# Patient Record
Sex: FEMALE | Race: WHITE | Employment: FULL TIME | URBAN - NONMETROPOLITAN AREA
[De-identification: names, ages, dates, MRNs, and addresses within clinical notes are randomized per-mention and may not be internally consistent; named-entity substitution may affect disease eponyms.]

---

## 2021-03-08 ENCOUNTER — HOSPITAL ENCOUNTER (OUTPATIENT)
Dept: PHYSICAL THERAPY | Age: 31
Discharge: HOME | End: 2021-03-08
Payer: COMMERCIAL

## 2021-03-08 PROCEDURE — 97161 PT EVAL LOW COMPLEX 20 MIN: CPT

## 2021-03-08 NOTE — PROGRESS NOTES
PT INITIAL EVALUATION NOTE 2-15    Patient Name: Lauren Pagan  Date:3/8/2021  : 1990  [x]  Patient  Verified  Payor: /    In time:15:38  Out time:16:16  Total Treatment Time (min): 38  Visit #: 1     Treatment Area: Low back pain [M54.5]    SUBJECTIVE  Pain Level (0-10 scale): 8/10 (achy, sharp, stabbing, throbbing); worst: 10/10  Any medication changes, allergies to medications, adverse drug reactions, diagnosis change, or new procedure performed?: [] No    [x] Yes (see summary sheet for update)  Subjective: Pt reports she starting having lower back pain approximately 5 years ago but it has recently worsened. Pt reports that she recently had a fall in 2021 where she slipped on water and fell directly on her back. Pt reports she went to the emergency room as a result of the fall due to pain. Pt reports they performed an x-ray and CT scan, but told her to follow-up with her PCP. 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 Pt reports she received 2 injections in 2020 and 2020 which did not provide any relief of her pain. Pt reports she has pain that runs down into her L thigh as well as numbness and tingling that runs down into both of her feet. Pt reports she will be receiving a nerve block on 3/29/21 for the pain.      PLOF: community ambulator; independent with ADLs; children help with carrying groceries into apartment  Mechanism of Injury: chronic  Previous Treatment/Compliance: previous treatment for LBP  Radiographs: x-ray, CT scan, MRI - pt reports \"arthritis and bones are rubbing together\"  What increases symptoms: prolonged standing, prolonged walking, lifting, stairs  What decreases symptoms: heat  PMHx/Surgical Hx: depression, arthritis, asthma, recent weight change  Work Hx: phlebotomist  Living Situation: apartment; 14 stairs; L sided hand railing  Pt Goals: \"to lessen the pain\"  Barriers: none  Motivation: good   Substance use: none noted   Cognition: A & O x 4   Fall Assessment: TU seconds       OBJECTIVE/EXAMINATION  Posture:  Good  Palpation: Difficult to palpate landmarks due to increased adipose tissue; TTP in generalized area of L4-S1 spinous processes and B lumbar paraspinals. Sensation: LT sensation intact in B LEs. Lumbar AROM (degrees):      Flexion             45 p! Extension             9 p! R           L  Side Bending   5 p!   9 p! Rotation   24 p!   29 p!       Manual Muscle Testing R  L  Hip Flexion                    5/5                  4+/5  Hip Abduction   5/5  5/5  Hip Adduction   5/5  5/5  Knee Extension            5/5                   5/5  Knee Flexion                5/5  5/5  Ankle PF   5/5  5/5  Ankle DF   5/5  4+/5  Great Toe Extension      Flexibility: Bilateral HS tightness      Special Tests: Slump: Positive    H.S. SLR: Positive            With   [x] TE   [] TA   [] neuro   [] other: Patient Education: [x] Provided HEP    [] Progressed/Changed HEP based on:   [] positioning   [] body mechanics   [] transfers   [] heat/ice application    [] other:        Pain Level (0-10 scale) post treatment: 8/10      ASSESSMENT:      [x]  See Plan of [address] Fairbanks PT, DPT 3/8/2021

## 2021-03-08 NOTE — PROGRESS NOTES
[address]  [place], [place]  Ph: [phone]    Fax: [phone]    Plan of Care/Statement of Necessity for Physical Therapy Services  2-15    Patient name: Freda Wills  : 1990  Provider#: [id number]  Referral source: Wilver Chairez MD      Medical/Treatment Diagnosis: Low back pain [M54.5]     Prior Hospitalization: see medical history     Comorbidities: see medical history  Prior Level of Function: community ambulator; independent with ADLs  Medications: Verified on Patient Summary List    Start of Care: 3/8/2021      Onset Date: chronic       The Plan of Care and following information is based on the information from the initial evaluation. Assessment/ key information: Pt is a pleasant 26 y.o. female presenting to physical therapy with signs and symptoms consistent with LBP with lumbar radiculopathy. Pt demonstrates increased lumbar and LE pain, presence of numbness and tingling, decreased lumbar AROM, decreased LE strength, as well as decreased functional mobility. Pt would benefit from skilled physical therapy to improve pain with the use of modalities, improve ROM and LE strength with therapeutic exercises, and improve functional mobility.       Evaluation Complexity History HIGH Complexity :3+ comorbidities / personal factors will impact the outcome/ POC ; Examination HIGH Complexity : 4+ Standardized tests and measures addressing body structure, function, activity limitation and / or participation in recreation  ;Presentation LOW Complexity : Stable, uncomplicated  ;Clinical Decision Making TUG Score: 11 seconds  Overall Complexity Rating: LOW     Problem List: pain affecting function, decrease ROM, decrease strength, decrease ADL/ functional abilitiies, decrease flexibility/ joint mobility and decrease transfer abilities   Treatment Plan may include any combination of the following: Therapeutic exercise, Therapeutic activities, Neuromuscular re-education, Physical agent/modality, Manual therapy, Patient education, Functional mobility training and Stair training  Patient / Family readiness to learn indicated by: trying to perform skills  Persons(s) to be included in education: patient (P)  Barriers to Learning/Limitations: None  Patient Goal (s): to lessen pain  Patient Self Reported Health Status: fair  Rehabilitation Potential: good    Short Term Goals: To be accomplished in 4 treatments:  1. Pt will be independent and compliant with HEP to facilitate functional mobility. 2. Pt will demonstrate proper lifting technique to facilitate lifting boxes at work. 3. Pt will be able to stand for at least 15 minutes with pain no greater than a 6/10 to facilitate washing dishes. 4. Pt will be able to ambulate 500' with pain no greater than a 6/10 to facilitate household ambulation. Long Term Goals: To be accomplished in 8 treatments:  1. Pt will demonstrate centralization of symptoms to facilitate functional mobility. 2. Pt will be able to stand for at least 30 minutes with pain no greater than a 4/10 to facilitate washing dishes. 3. Pt will be able to ambulate 0.25 miles with pain no greater than a 4/10 to facilitate community ambulation. 4. Pt will be able to lift 20# from floor to table with proper technique to facilitate work related duties. Frequency / Duration: Patient to be seen 2 times per week for 4 weeks. Patient/ Caregiver education and instruction: exercises    [x]  Plan of care has been reviewed with WILFRIDO Isaacs, PT, DPT 3/8/2021     ________________________________________________________________________    I certify that the above Therapy Services are being furnished while the patient is under my care. I agree with the treatment plan and certify that this therapy is necessary.     Physician's Signature:____________________  Date:____________Time: _________    Patient name: Nella Ron  : 1990  Provider#: [id number]

## 2021-03-12 ENCOUNTER — APPOINTMENT (OUTPATIENT)
Dept: PHYSICAL THERAPY | Age: 31
End: 2021-03-12
Payer: COMMERCIAL

## 2021-03-16 ENCOUNTER — HOSPITAL ENCOUNTER (OUTPATIENT)
Dept: PHYSICAL THERAPY | Age: 31
Discharge: HOME | End: 2021-03-16
Payer: COMMERCIAL

## 2021-03-16 PROCEDURE — 97014 ELECTRIC STIMULATION THERAPY: CPT

## 2021-03-16 PROCEDURE — 97110 THERAPEUTIC EXERCISES: CPT

## 2021-03-16 NOTE — PROGRESS NOTES
PT DAILY TREATMENT NOTE 2-15    Patient Name: Porter Garza  Date:3/16/2021  : 1990  [x]  Patient  Verified  Payor: Erik Hernandez / Plan: [employer] HMO / Product Type: HMO /    In time:03:20 PM  Out time: 4:00 PM  Total Treatment Time (min): 40  Visit #:  2    Treatment Area: Low back pain [M54.5]    SUBJECTIVE  Pain Level (0-10 scale): 7    Any medication changes, allergies to medications, adverse drug reactions, diagnosis change, or new procedure performed?: [x] No    [] Yes (see summary sheet for update)     Subjective functional status/changes:   [x] No changes reported      OBJECTIVE    Modality rationale: decrease inflammation and decrease pain to improve the patients ability to stand for at least 15 minutes with pain no greater than a 6/10 to facilitate washing dishes. Min Type Additional Details      15 [x] Estim: []Att   [x]Unatt    []TENS instruct                  [x]IFC  []Premod   []NMES                     []Other:  []w/US   []w/ice   []w/heat  Position: prone  Location:low back       []  Traction: [] Cervical       []Lumbar                       [] Prone          []Supine                       []Intermittent   []Continuous Lbs:  [] before manual  [] after manual  []w/heat    []  Ultrasound: []Continuous   [] Pulsed                       at: []1MHz   []3MHz Location:  W/cm2:    [] Paraffin         Location:   []w/heat    []  Ice     []  Heat  []  Ice massage Position:  Location:    []  Laser  []  Other: Position:  Location:      []  Vasopneumatic Device Pressure:       [] lo [] med [] hi   Temperature:      [x] Skin assessment post-treatment:  [x]intact []redness- no adverse reaction    []redness - adverse reaction:         25 min Therapeutic Exercise:  [x] See flow sheet :   Rationale: increase ROM to improve the patients ability to increase daily activities.            With   [] TE   [] TA   [] Neuro   [] SC   [] other: Patient Education: [x] Review HEP    [] Progressed/Changed HEP based on: [] positioning   [] body mechanics   [] transfers   [] heat/ice application    [] other:        Pain Level (0-10 scale) post treatment: 7    ASSESSMENT/Changes in Function:    Patient was experiencing no change in pain level of the low back. Decreased lumbar range of motion in trunk rotation. Patient will need copy of a comprehensive exercise program on the next visit. Patient will continue to benefit from skilled PT services to address ROM deficits and analyze and address soft tissue restrictions to attain remaining goals. [x]  See Plan of Care  []  See progress note/recertification  []  See Discharge Summary         Progress towards goals / Updated goals:  Short Term Goals: To be accomplished in 4 treatments:  1. Pt will be independent and compliant with HEP to facilitate functional mobility. 2. Pt will demonstrate proper lifting technique to facilitate lifting boxes at work. 3. Pt will be able to stand for at least 15 minutes with pain no greater than a 6/10 to facilitate washing dishes. 4. Pt will be able to ambulate 500' with pain no greater than a 6/10 to facilitate household ambulation.     Long Term Goals: To be accomplished in 8 treatments:  1. Pt will demonstrate centralization of symptoms to facilitate functional mobility. 2. Pt will be able to stand for at least 30 minutes with pain no greater than a 4/10 to facilitate washing dishes. 3. Pt will be able to ambulate 0.25 miles with pain no greater than a 4/10 to facilitate community ambulation.    4. Pt will be able to lift 20# from floor to table with proper technique to facilitate work related duties    PLAN  []  Upgrade activities as tolerated     [x]  Continue plan of care  []  Update interventions per flow sheet       []  Discharge due to:_  []  Other:_      Alberto Bolanos, PT 3/16/2021

## 2021-04-16 NOTE — PROGRESS NOTES
[address]  [place], [address] [place]  Ph: [phone]     Fax: [phone]    Discharge Summary 2-15    Patient name: Porter Garza  : 1990  Provider#: [id number]  Referral source: Aissatou Tilley MD      Medical/Treatment Diagnosis: Low back pain [M54.5]     Prior Hospitalization: see medical history     Comorbidities: See Plan of Care  Prior Level of Function: See Plan of Care  Medications: Verified on Patient Summary List    Start of Care: 3/8/2021      Onset Date:chronic    Visits from Start of Care: 2     Missed Visits: 2  Reporting Period : 3/8/2021 to 3/16/2021    Assessment/Summary of care:     Patient has not returned for treatment since 3/16/2021. Patient has completed only 1 treatment session in addition to her Initial Evaluation, and has missed multiple visits since then. Patient was called secondary to absence  and states she has recently went to a Pain Management Specialist, who has also written her a physical therapy prescription with the same diagnosis. Per patient request, pt would like to discontinue her physical therapy course under Dr. Bailey Arzate, and begin a new physical therapy course under the Pain Management Specialist.  Patient is now formally discharged from physical therapy for this reason. Goals have not been formally assessed due to patient's discontinuance. Progress Toward Goals:  Short Term Goals: To be accomplished in 4 treatments:  1. Pt will be independent and compliant with HEP to facilitate functional mobility. -Not Met  2. Pt will demonstrate proper lifting technique to facilitate lifting boxes at work.  -Not Met  3. Pt will be able to stand for at least 15 minutes with pain no greater than a 6/10 to facilitate washing dishes. -Not Met  4.  Pt will be able to ambulate 500' with pain no greater than a 6/10 to facilitate household ambulation. -Not Met     Long Term Goals: To be accomplished in 8 treatments:  1. Pt will demonstrate centralization of symptoms to facilitate functional mobility.  -Not Met  2. Pt will be able to stand for at least 30 minutes with pain no greater than a 4/10 to facilitate washing dishes. -Not Met  3. Pt will be able to ambulate 0.25 miles with pain no greater than a 4/10 to facilitate community ambulation.  -Not Met  4.  Pt will be able to lift 20# from floor to table with proper technique to facilitate work related duties. -Not Met        RECOMMENDATIONS:  [x]Discontinue therapy: []Patient has reached or is progressing toward set goals     []Patient is non-compliant or has abdicated     []Due to lack of appreciable progress towards set goals     [x]Other- See above    Elena Gallegos, PT, DPT  4/16/2021

## 2021-04-20 ENCOUNTER — HOSPITAL ENCOUNTER (OUTPATIENT)
Dept: PHYSICAL THERAPY | Age: 31
Discharge: HOME | End: 2021-04-20
Payer: COMMERCIAL

## 2021-04-20 PROCEDURE — 97161 PT EVAL LOW COMPLEX 20 MIN: CPT

## 2021-04-20 NOTE — PROGRESS NOTES
[address]  [place], [place]  Ph: [phone]    Fax: [phone]    Plan of Care/Statement of Necessity for Physical Therapy Services  2-15    Patient name: Kate Brizuela  : 1990  Provider#: [id number]  Referral source: PHUC Bertrand      Medical/Treatment Diagnosis: Low back pain [M54.5]     Prior Hospitalization: see medical history     Comorbidities: none  Prior Level of Function: independent  Medications: Verified on Patient Summary List    Start of Care: 2021      Onset Date: 2021       The Plan of Care and following information is based on the information from the initial evaluation. Assessment/ key information: Patient is a 26year old black female presenting signs and symptoms consistent to LBP with intermittent lumbar radiculopathy of the left LE. Patient demonstrated increased lumbar pain (extension and lateral flexion), decrease lumbar AROM, decreased LE strength as well as decreased functional mobility. Patient would benefit from skilled Physical Therapy to improve pain with the use of modalities, improve ROM and LE strength with therapeutic exercises and improve functional mobility. Patient will continue Physical Therapy 1 time a week due to job in [place], [place]. A home exercise program will be established with  1:1 supervision for compliance.     Evaluation Complexity History LOW Complexity : Zero comorbidities / personal factors that will impact the outcome / POC; Examination LOW Complexity : 1-2 Standardized tests and measures addressing body structure, function, activity limitation and / or participation in recreation  ;Presentation LOW Complexity : Stable, uncomplicated  ;Clinical Decision Making (TUG Test 12 sec  Overall Complexity Rating: Low  Problem List: pain affecting function, decrease ROM, decrease strength and decrease ADL/ functional abilitiies   Treatment Plan may include any combination of the following: Therapeutic exercise, Therapeutic activities, Neuromuscular re-education, Physical agent/modality, Manual therapy, Patient education and Functional mobility training  Patient / Family readiness to learn indicated by: asking questions  Persons(s) to be included in education: patient (P)  Barriers to Learning/Limitations: None  Patient Goal (s): To decrease low back pain  Patient Self Reported Health Status: fair  Rehabilitation Potential: fair    Short Term Goals: To be accomplished in 5 treatments:   Patient will be independent and compliant with HEP to facilitate functional mobility. Patient will be able to stand at least 15 minutes with pain no greater than 6/10 to facilitate washing dishes. Patient will be able to ambulate for at 3 minutes with pain no greater than a 6/10 to facilitate household ambulation. Long Term Goals: To be accomplished in 10  treatments:   Patient will be able to stand at least 30 minutes with pain no greater than 6/10 to facilitate washing dishes. Patient will be able to ambulate for at 5 minutes with pain no greater than a 6/10 to facilitate household ambulation. Patient will be able to ambulate for at least 10 minutes with pain no greater than 4/10 to facilitate community distances. Patient will be able to sleep in bed  be for at least 4 hours without disturbance from pain. Frequency / Duration: Patient to be seen 1-2  times per week for 10  treatments due to job schedule. Patient/ Caregiver education and instruction: exercises    [x]  Plan of care has been reviewed with WILFRIDO Marin, PT 4/20/2021     ________________________________________________________________________    I certify that the above Therapy Services are being furnished while the patient is under my care. I agree with the treatment plan and certify that this therapy is necessary.     [facility] Signature:____________________  Date:____________Time: _________      Jalil Guallpa MD

## 2021-04-20 NOTE — PROGRESS NOTES
PT INITIAL EVALUATION NOTE 2-15    Patient Name: Vick Rivas  Date:2021  : 1990  [x]  Patient  Verified  Payor: [employer] / Plan: [employer]isha [employer]elfe[employer] HMO / Product Type: HMO /    In time:10:15 AM  Out time: 10:50    Visit #: 1     Treatment Area: Low back pain [M54.5]    SUBJECTIVE  Pain Level (0-10 scale): 7  Any medication changes, allergies to medications, adverse drug reactions, diagnosis change, or new procedure performed?: [] No    [x] Yes (see summary sheet for update)    Subjective: 26year old black female re-referred to Physical Therapy by PHUC Hernandez at pain clinic to continue treatment for low back pain. Patient stated that she did fall in 2021 and injured her back. Since that time, pain gradually became worse. Patient would like to continue Physical Therapy before receiving a spinal block from Dr. Silvia Temple specialist. Chief Complaint: Not able to stand over 15 minutes, difficulty climbing steps, positional changes in sitting position, difficulty sleeping in bed. PLOF: Independent  Mechanism of Injury: Fall on 2021  Previous Treatment/Compliance: 2 lumbar epidural injection   PMHx/Surgical Hx: Physical Therapy for 2 visits on 2021  Work Hx: Phlebotomist   Living Situation: Apartment with family  Pt Goals:  To decrease pain  Barriers: none  Motivation: Good  Substance use: none   Cognition: A & O x 4        OBJECTIVE/EXAMINATION    Other Objective/Functional Measures:   TUG Test: 12 sec  Lumbar Oswestry Score:  46%  Pain Level (0-10 scale) post treatment: 7    OBJECTIVE    Posture: Fair - rounded shoulders, forward head  Gait and Functional Mobility:  No Assistive device  Palpation: Tenderness of Lumbar  L1-L5 vertebrae   Sensation: Intact        Lumbar AROM:      Flexion             45 degrees      Extension            10 degrees                     R                    L  Side Bending   30 degrees  30 degrees    Rotation   30 degrees  40 degrees      Manual Muscle Testing  Hip Flexion                  4/5 4/5  Knee Extension           4/5 4/5  Knee Flexion               4/5 4/5  Ankle PF  5/5 5/5  Ankle DF  5/5 5/5            ASSESSMENT:      [x]  See Plan of [facility], PT 4/20/2021

## 2021-04-27 ENCOUNTER — HOSPITAL ENCOUNTER (OUTPATIENT)
Dept: PHYSICAL THERAPY | Age: 31
Discharge: HOME | End: 2021-04-27
Payer: COMMERCIAL

## 2021-04-27 PROCEDURE — 97014 ELECTRIC STIMULATION THERAPY: CPT

## 2021-04-27 PROCEDURE — 97110 THERAPEUTIC EXERCISES: CPT

## 2021-04-27 NOTE — PROGRESS NOTES
PT DAILY TREATMENT NOTE 2-15    Patient Name: La Nena Charles  Date:2021  : 1990  [x]  Patient  Verified  Payor: Peng Kahlil / Plan: Geri Marquez HMO / Product Type: HMO /    In time: 10:03  Out time: 10:56  Total Treatment Time (min): 48  Visit #:  2    Treatment Area: Low back pain [M54.5]    SUBJECTIVE  Pain Level (0-10 scale): 7/10  Any medication changes, allergies to medications, adverse drug reactions, diagnosis change, or new procedure performed?: [x] No    [] Yes (see summary sheet for update)  Subjective functional status/changes:   [x] No changes reported    OBJECTIVE      Modality rationale: decrease pain and increase tissue extensibility to improve the patients ability to be able to perform AROM   Min Type Additional Details      10 [x] Estim: []Att   [x]Unatt    []TENS instruct                  [x]IFC  []Premod   []NMES                    []Other:  []w/US      [x]w/ heat  []w/ ice  Position: seated   Location: low back       []  Traction: [] Cervical       []Lumbar                       [] Prone          []Supine                       []Intermittent   []Continuous Lbs:  [] before manual  [] after manual  [] w/ heat  [] Simultaneously performed with w/ Estim    []  Ultrasound: []Continuous   [] Pulsed                       at: []1MHz   []3MHz Location:  W/cm2:    [] Paraffin         Location:   []w/heat    []  Ice     []  Heat  []  Ice massage Position:  Location:    []  Laser  []  Other: Position:  Location:      []  Vasopneumatic Device Pressure:       [] lo [] med [] hi   [] w/ ice      Temperature:   [] Simultaneously performed with w/ Estim     [x] Skin assessment post-treatment:  [x]intact [x]redness- no adverse reaction    []redness  adverse reaction:       43 min Therapeutic Exercise:  [] See flow sheet :   Rationale: increase ROM and increase strength to improve the patients ability to improve functional mobility         With   [x] TE   [] TA   [] neuro   [] other: Patient Education: [x] Review HEP    [] Progressed/Changed HEP based on:   [] positioning   [] body mechanics   [] transfers   [] heat/ice application    [] other:      Other Objective/Functional Measures: Initiated ROM and strengthening today. Pain Level (0-10 scale) post treatment: 6/10    ASSESSMENT/Changes in Function: Patient tolerated treatment fairly well. No reports of increased pain, just tightness with stretches. Patient will continue to benefit from skilled PT services to address functional mobility deficits, address ROM deficits and address strength deficits to attain remaining goals. [x]  See Plan of Care  []  See progress note/recertification  []  See Discharge Summary         Progress towards goals / Updated goals:  Short Term Goals: To be accomplished in 5 treatments:               Patient will be independent and compliant with HEP to facilitate functional mobility. Patient will be able to stand at least 15 minutes with pain no greater than 6/10 to facilitate washing dishes. Patient will be able to ambulate for at 3 minutes with pain no greater than a 6/10 to facilitate household ambulation.     Long Term Goals: To be accomplished in 10  treatments:               Patient will be able to stand at least 30 minutes with pain no greater than 6/10 to facilitate washing dishes. Patient will be able to ambulate for at 5 minutes with pain no greater than a 6/10 to facilitate household ambulation. Patient will be able to ambulate for at least 10 minutes with pain no greater than 4/10 to facilitate community distances. Patient will be able to sleep in bed  be for at least 4 hours without disturbance from pain.     PLAN  [x]  Upgrade activities as tolerated     [x]  Continue plan of care  []  Update interventions per flow sheet       []  Discharge due to:_  []  Other:_      ANDREW Garza 4/27/2021

## 2021-05-18 ENCOUNTER — HOSPITAL ENCOUNTER (OUTPATIENT)
Dept: PHYSICAL THERAPY | Age: 31
Discharge: HOME | End: 2021-05-18
Payer: COMMERCIAL

## 2021-05-18 PROCEDURE — 97110 THERAPEUTIC EXERCISES: CPT

## 2021-05-18 PROCEDURE — 97014 ELECTRIC STIMULATION THERAPY: CPT

## 2021-05-18 NOTE — PROGRESS NOTES
PT DAILY TREATMENT NOTE 2-15    Patient Name: Tammy Mullen  Date:2021  : 1990  [x]  Patient  Verified  Payor: Heidy Delgado / Plan: Quique Patel HMO / Product Type: HMO /    In time:10:05 AM  Out time:11:00 AM  Total Treatment Time (min): 55  Visit #: 3    Treatment Area: Low back pain [M54.5]    SUBJECTIVE  Pain Level (0-10 scale): 7  Any medication changes, allergies to medications, adverse drug reactions, diagnosis change, or new procedure performed?: [x] No    [] Yes (see summary sheet for update)  Subjective functional status/changes:   [] No changes reported    I continue to work in [place]. Sometimes the ride bother me.     OBJECTIVE    Modality rationale: decrease inflammation, decrease pain and increase tissue extensibility to improve the patients ability to decrease inflammation, decrease pain and increase tissue extensibility   Min Type Additional Details      15 [] Estim: []Att   [x]Unatt    []TENS instruct                  [x]IFC  []Premod   []NMES                     []Other:  []w/US   []w/ice   [x]w/heat  Position: sitting  Location: low back       []  Traction: [] Cervical       []Lumbar                       [] Prone          []Supine                       []Intermittent   []Continuous Lbs:  [] before manual  [] after manual  []w/heat    []  Ultrasound: []Continuous   [] Pulsed                       at: []1MHz   []3MHz Location:  W/cm2:    [] Paraffin         Location:   []w/heat    []  Ice     []  Heat  []  Ice massage Position:  Location:    []  Laser  []  Other: Position:  Location:      []  Vasopneumatic Device Pressure:       [] lo [] med [] hi   Temperature:      [x] Skin assessment post-treatment:  [x]intact []redness- no adverse reaction    []redness  adverse reaction:         40 min Therapeutic Exercise:  [x] See flow sheet :   Rationale: increase ROM and increase strength to improve the patients ability to and at least 15 minutes   with pain no greater than 6/10 to facilitate washing dishes. With   [] TE   [] TA   [] Neuro   [] SC   [] other: Patient Education: [x] Review HEP    [] Progressed/Changed HEP based on:   [] positioning   [] body mechanics   [] transfers   [] heat/ice application    [] other:      Other Objective/Functional Measures: No swelling of the low back. Pain is present with increased mobility. Pain Level (0-10 scale) post treatment: 6    ASSESSMENT/Changes in Function:   Review HEP for compliance. Increase activities to include lumbar stabilization and flexible exercises to improve mobility. Patient will continue to benefit from skilled PT services to address ROM deficits, address strength deficits, analyze and address soft tissue restrictions and analyze and cue movement patterns to attain remaining goals. [x]  See Plan of Care  []  See progress note/recertification  []  See Discharge Summary           Progress towards goals / Updated goals:  Short Term Goals: To be accomplished in 5 treatments:    Goal:Patient will be independent and compliant with HEP to facilitate functional mobility. Status at Progress report:    Goal: Patient will be able to stand at least 15 minutes with pain no greater than 6/10 to facilitate washing dishes. Status at Progress report:     Goal: Patient will be able to ambulate for at 3 minutes with pain no greater than a 6/10 to facilitate household ambulation.    Status at Progress report:   [facility]. be accomplished in 10  treatments:    Goal: Patient will be able to stand at least 30 minutes with pain no greater than 6/10 to facilitate washing dishes. Status at Progress report:    Goal: Patient will be able to ambulate for at 5 minutes with pain no greater than a 6/10 to facilitate household ambulation. Status at progress report:    Goal: Patient will be able to ambulate for at least 10 minutes with pain no greater than 4/10 to facilitate community distances.     Status at Progress report:   Goal: Patient will be able to sleep in bed  be for at least 4 hours without disturbance from pain.       PLAN  []  Upgrade activities as tolerated     [x]  Continue plan of care  []  Update interventions per flow sheet       []  Discharge due to:_  []  Other:_      Carlita Oneil, PT 5/18/2021

## 2021-05-18 NOTE — PROGRESS NOTES
[address]  [place], [address] [place]  Ph: [phone]    Fax: [phone]    Progress Note    Name: Collin Galicia   : 1990   MD: PHUC Payne-C       Treatment Diagnosis: Low back pain [M54.5]  Start of Care: 3/8/21    Visits from Start of Care: 3  Missed Visits: 3    Summary of Care:Subjective: 26year old black female re-referred to Physical Therapy by PHUC Santos at pain clinic to continue treatment for low back pain. Patient stated that she did fall in 2021 and injured her back. Since that time, pain gradually became worse. Patient would like to continue Physical Therapy before receiving a spinal block from Dr. Monroe Rdz specialist. Chief Complaint: Not able to stand over 15 minutes, difficulty climbing steps, positional changes in sitting position, difficulty sleeping in bed. Patient has completed 3 visits since the initial visit. Goals have been partially met. Patient will continue to benefit from skilled Physical Therapy to improve pain with the use of modalities, improve ROM and LE strength with therapeutic exercises and improve functional mobility. Patient will continue Physical Therapy 1 time a week due to job in [place], [place]. A home exercise program will be established with  1:1 supervision for compliance.         Progress towards goals / Updated goals:  Short Term Goals: To be accomplished in 5 treatments:    Goal:Patient will be independent and compliant with HEP to facilitate functional mobility. Status at Progress report: Partially met    Goal: Patient will be able to stand at least 15 minutes with pain no greater than 6/10 to facilitate washing dishes.     Status at Progress report: partially met    Goal: Patient will be able to ambulate for at 3 minutes with pain no greater than a 6/10 to facilitate household ambulation.    Status at Progress report: partially met    [facility] be accomplished in 10  treatments:    Goal: Patient will be able to stand at least 30 minutes with pain no greater than 6/10 to facilitate washing dishes. Status at Progress report: partially met    Goal: Patient will be able to ambulate for at 5 minutes with pain no greater than a 6/10 to facilitate household ambulation. Status at progress report:partially met    Goal: Patient will be able to ambulate for at least 10 minutes with pain no greater than 4/10 to facilitate community distances. Status at Progress report: partially met     Goal: Patient will be able to sleep in bed  be for at least 4 hours without disturbance from pain.     Status at progress report: partially met    Other: Continue Physical Therapy for 7 visits to complete the plan of care prior to returning to MD. Estefany Chandler, PT 5/18/2021

## 2021-05-25 ENCOUNTER — HOSPITAL ENCOUNTER (OUTPATIENT)
Dept: PHYSICAL THERAPY | Age: 31
Discharge: HOME | End: 2021-05-25
Payer: COMMERCIAL

## 2021-05-25 PROCEDURE — 97014 ELECTRIC STIMULATION THERAPY: CPT

## 2021-05-25 PROCEDURE — 97110 THERAPEUTIC EXERCISES: CPT

## 2021-05-25 NOTE — PROGRESS NOTES
PT DAILY TREATMENT NOTE 2-15    Patient Name: Judith Carrion  Date:2021  : 1990  [x]  Patient  Verified  Payor: Addison Bull / Plan: Tatum Banerjee HMO / Product Type: HMO /    In time:1001 am  Out time:1113 am   Total Treatment Time (min): 72  Visit #:  4    Treatment Area: Low back pain [M54.5]    SUBJECTIVE  Pain Level (0-10 scale): 7/10  Any medication changes, allergies to medications, adverse drug reactions, diagnosis change, or new procedure performed?: [x] No    [] Yes (see summary sheet for update)  Subjective functional status/changes:   [] No changes reported  \"Pt reports still hurting and not much has changed. Lane Mauro \"    OBJECTIVE      Modality rationale: decrease inflammation, decrease pain and increase tissue extensibility to improve the patients ability to increase back movement and function   Min Type Additional Details      15 [x] Estim: []Att   [x]Unatt    []TENS instruct                  []IFC  [x]Premod   []NMES                    []Other:  []w/US      [x]w/ heat  []w/ ice  Position: seated  Location: low back        []  Traction: [] Cervical       []Lumbar                       [] Prone          []Supine                       []Intermittent   []Continuous Lbs:  [] before manual  [] after manual  [] w/ heat  [] Simultaneously performed with w/ Estim    []  Ultrasound: []Continuous   [] Pulsed                       at: []1MHz   []3MHz Location:  W/cm2:    [] Paraffin         Location:   []w/heat    []  Ice     []  Heat  []  Ice massage Position:  Location:    []  Laser  []  Other: Position:  Location:      []  Vasopneumatic Device Pressure:       [] lo [] med [] hi   [] w/ ice      Temperature:   [] Simultaneously performed with w/ Estim     [x] Skin assessment post-treatment:  [x]intact []redness- no adverse reaction    []redness  adverse reaction:       57 min Therapeutic Exercise:  [x] See flow sheet :   Rationale: increase ROM, increase strength and improve coordination to improve the patients ability to increase functional mobility and back motion with decreased pain during tasks. With   [] TE   [] TA   [] neuro   [] other: Patient Education: [x] Review HEP    [] Progressed/Changed HEP based on:   [] positioning   [] body mechanics   [] transfers   [] heat/ice application    [] other:        Pain Level (0-10 scale) post treatment: 7/10    ASSESSMENT/Changes in Function:   Patient tolerated treatment session with cues for stretching and modifications to some of her tighter areas. HEP reviewed and hand out given with red thera band for home use with QW visits. Pt has increased tightness on L verse R and to ease up when increased discomfort. Patient will continue to benefit from skilled PT services to modify and progress therapeutic interventions, address functional mobility deficits, address ROM deficits, address strength deficits, analyze and address soft tissue restrictions and analyze and cue movement patterns to attain remaining goals.      [x]  See Plan of Care  []  See progress note/recertification  []  See Discharge Summary         Progress towards goals / Updated goals:  Short Term Goals: To be accomplished in 5 treatments:    Goal:Patient will be independent and compliant with HEP to facilitate functional mobility.    Status at Progress report: Partially met    Goal: Patient will be able to stand at least 15 minutes with pain no greater than 6/10 to facilitate washing dishes.    Status at Progress report: partially met    Goal: Patient will be able to ambulate for at 3 minutes with pain no greater than a 6/10 to facilitate household ambulation.    Status at Progress report: partially met     Long Term Goals: To be accomplished in 10  treatments:    Goal: Patient will be able to stand at least 30 minutes with pain no greater than 6/10 to facilitate washing dishes.    Status at Progress report: partially met    Goal: Patient will be able to ambulate for at 5 minutes with pain no greater than a 6/10 to facilitate household ambulation.    Status at progress report:partially met    Goal: Patient will be able to ambulate for at least 10 minutes with pain no greater than 4/10 to facilitate community distances.    Status at Progress report: partially met     Goal: Patient will be able to sleep in bed  be for at least 4 hours without disturbance from pain. Status at progress report: partially met    PLAN  [x]  Upgrade activities as tolerated     [x]  Continue plan of care  []  Update interventions per flow sheet       []  Discharge due to:_  []  Other:_      Matheus Castillo 5/25/2021

## 2021-06-01 ENCOUNTER — APPOINTMENT (OUTPATIENT)
Dept: PHYSICAL THERAPY | Age: 31
End: 2021-06-01
Payer: COMMERCIAL

## 2021-06-08 ENCOUNTER — APPOINTMENT (OUTPATIENT)
Dept: PHYSICAL THERAPY | Age: 31
End: 2021-06-08
Payer: COMMERCIAL

## 2021-06-11 ENCOUNTER — APPOINTMENT (OUTPATIENT)
Dept: PHYSICAL THERAPY | Age: 31
End: 2021-06-11
Payer: COMMERCIAL

## 2021-06-22 ENCOUNTER — HOSPITAL ENCOUNTER (OUTPATIENT)
Dept: PHYSICAL THERAPY | Age: 31
Discharge: HOME | End: 2021-06-22
Payer: COMMERCIAL

## 2021-06-22 PROCEDURE — 97110 THERAPEUTIC EXERCISES: CPT

## 2021-06-22 NOTE — PROGRESS NOTES
[address] [facility], [address] [place]  Ph: [phone]    Fax: [phone]    Discharge Summary  2-15    Patient name: Larry Heredia  : 1990  Provider#: [id number]  Referral source: Roscoe Nash PA-C      Medical/Treatment Diagnosis: Low back pain [M54.5]     Prior Hospitalization: see medical history     Comorbidities: See Plan of Care  Prior Level of Function:See Plan of Care  Medications: Verified on Patient Summary List    Start of Care: 2021      Onset Date: 2021     Visits from [facility] Care: 5     Missed Visits: 6    Reporting Period : 2021 to 2021      ASSESSMENT/SUMMARY OF CARE: Patient is a pleasant 31 y.o. female presented to physical therapy with signs and symptoms consistent with LBP with lumbar radiculopathy. Pt demonstrates increased lumbar and LE pain, presence of numbness and tingling, decreased lumbar AROM, decreased LE strength, as well as decreased functional mobility during the initial assessment. Patient has received treatments which included modalities and lumbar stabilization and stretching exercises to improve mobility. Patient has been instructed in HEP and able to perform. Pain level remains 7/10. She continues to complain of back pain which is manageable based on activities. Note a slight increase in lumbar range of motion. Patient is able to perform daily activities as tolerated. Short term goals have been met. Long Term goals have been partially met.  No further Physical Therapy is advised at this time.  Patient agreed to return to MD for next plan of care.         Assessment/Recommendation:  Short Term Goals: To be accomplished in 5 treatments:    Goal:Patient will be independent and compliant with HEP to facilitate functional mobility.    Status at Progress report:  met    Goal: Patient will be able to stand at least 15 minutes with pain no greater than 6/10 to facilitate washing dishes.    Status at Progress report:  met    Goal: Patient will be able to ambulate for at 3 minutes with pain no greater than a 6/10 to facilitate household ambulation.    Status at Progress report:  met     Long Term Goals: To be accomplished in 10  treatments:    Goal: Patient will be able to stand at least 30 minutes with pain no greater than 6/10 to facilitate washing dishes.    Status at Progress report: partially met    Goal: Patient will be able to ambulate for at 5 minutes with pain no greater than a 6/10 to facilitate household ambulation.    Status at progress report: met    Goal: Patient will be able to ambulate for at least 10 minutes with pain no greater than 4/10 to facilitate community distances.    Status at Progress report: partially met     Goal: Patient will be able to sleep in bed  be for at least 4 hours without disturbance from pain.    Status at progress report: partially met           RECOMMENDATIONS:  [x]Discontinue therapy: []Patient has reached or is progressing toward set goals      []Patient is non-compliant or has abdicated      []Due to lack of appreciable progress towards set goals      [x]Other: Referred to MD Martin East, PT 6/22/2021

## 2021-06-22 NOTE — PROGRESS NOTES
PT DAILY TREATMENT NOTE 2-15    Patient Name: Andre Murray  Date:2021  : 1990  [x]  Patient  Verified  Payor: Mariano Small / Plan: Rosemary Suarez HMO / Product Type: HMO /    In time:10:10 AM  Out time: 10:55 AM  Total Treatment Time (min): 45  Visit #:  5    Treatment Area: Low back pain [M54.5]    SUBJECTIVE  Pain Level (0-10 scale): 7  Any medication changes, allergies to medications, adverse drug reactions, diagnosis change, or new procedure performed?: [x] No    [] Yes (see summary sheet for update)  Subjective functional status/changes:   [] No changes reported    I return to the MD on 2021. I am able to walk in [employer] for groceries and perform household chores and washing dishes with managable pain. I avoid sweeping and mopping. Perform exercises as often as I can. OBJECTIVE      45 min Therapeutic Exercise:  [x] See flow sheet :   Rationale: increase ROM to improve the patients ability to be independent and compliant with HEP to facilitate functional mobility. With   [] TE   [] TA   [] Neuro   [] SC   [] other: Patient Education: [x] Review HEP    [] Progressed/Changed HEP based on:   [] positioning   [] body mechanics   [] transfers   [] heat/ice application    [] other:      Other Objective/Functional Measures:        Flexion                                                 45 p! Extension                                             15 p! R                                  L  Side Bending                           12 p!                             20 p! Rotation                                    30 p!                            32  p! Pain Level (0-10 scale) post treatment: 7    ASSESSMENT/Changes in Function:   Patient has been instructed in HEP and able to perform. Pain level remains 7/10.  She continues to complain of back pain which is manageable based on activities. Note a slight increase in lumbar range of motion. Patient is able to perform daily activities as tolerated. Short term goals have been met. Long Term goals have been partially met. No further Physical Therapy is advised at this time. Patient agreed to return to MD for next plan of care. to attain remaining goals.      [x]  See Plan of Care  []  See progress note/recertification  []  See Discharge Summary           Progress towards goals / Updated goals:    Short Term Goals: To be accomplished in 5 treatments:    Goal:Patient will be independent and compliant with HEP to facilitate functional mobility.    Status at Progress report:  met    Goal: Patient will be able to stand at least 15 minutes with pain no greater than 6/10 to facilitate washing dishes.    Status at Progress report:  met    Goal: Patient will be able to ambulate for at 3 minutes with pain no greater than a 6/10 to facilitate household ambulation.    Status at Progress report:  met     Long Term Goals: To be accomplished in 10  treatments:    Goal: Patient will be able to stand at least 30 minutes with pain no greater than 6/10 to facilitate washing dishes.    Status at Progress report: partially met    Goal: Patient will be able to ambulate for at 5 minutes with pain no greater than a 6/10 to facilitate household ambulation.    Status at progress report: met    Goal: Patient will be able to ambulate for at least 10 minutes with pain no greater than 4/10 to facilitate community distances.    Status at Progress report: partially met     Goal: Patient will be able to sleep in bed  be for at least 4 hours without disturbance from pain.    Status at progress report: partially met       PLAN  []  Upgrade activities as tolerated     []  Continue plan of care  []  Update interventions per flow sheet       []  Discharge due to:_  []  Other:_ Referred to MD Angel Dawkins, PT 6/22/2021

## 2021-06-22 NOTE — PROGRESS NOTES
[address]  [place], [address] [place] [place]  Ph: [phone]    Fax: [phone]    Progress Note    Name: Brea Lopez   : 1990   MD: Heaven Molina PA-C       Treatment Diagnosis: Low back pain [M54.5]  Start of Care: 2021    Visits from Start of Care: 5  Missed Visits: 6    Summary of Care: Assessment/ key information: Pt is a pleasant 26 y.o. female presenting to physical therapy with signs and symptoms consistent with LBP with lumbar radiculopathy. Pt demonstrates increased lumbar and LE pain, presence of numbness and tingling, decreased lumbar AROM, decreased LE strength, as well as decreased functional mobility during the initial assessment. Patient has received treatments which included modalities and lumbar stabilization and stretching exercises to improve mobility. Patient has been instructed in HEP and able to perform. Pain level remains 7/10. She continues to complain of back pain which is manageable based on activities. Note a slight increase in lumbar range of motion. Patient is able to perform daily activities as tolerated. Short term goals have been met. Long Term goals have been partially met. No further Physical Therapy is advised at this time. Patient agreed to return to MD for next plan of care.             Assessment / Recommendations:     Short Term Goals: To be accomplished in 5 treatments:    Goal:Patient will be independent and compliant with HEP to facilitate functional mobility.    Status at Progress report:  met    Goal: Patient will be able to stand at least 15 minutes with pain no greater than 6/10 to facilitate washing dishes.    Status at Progress report:  met    Goal: Patient will be able to ambulate for at 3 minutes with pain no greater than a 6/10 to facilitate household ambulation.    Status at Progress report:  met     Long Term Goals: To be accomplished in 10  treatments:    Goal: Patient will be able to stand at least 30 minutes with pain no greater than 6/10 to facilitate washing dishes.    Status at Progress report: partially met    Goal: Patient will be able to ambulate for at 5 minutes with pain no greater than a 6/10 to facilitate household ambulation.    Status at progress report: met    Goal: Patient will be able to ambulate for at least 10 minutes with pain no greater than 4/10 to facilitate community distances.    Status at Progress report: partially met     Goal: Patient will be able to sleep in bed  be for at least 4 hours without disturbance from pain.    Status at progress report: partially met           Other: Patient has been referred to MD for follow up visit.       Fallon Craft, PT 6/22/2021

## 2021-06-25 ENCOUNTER — APPOINTMENT (OUTPATIENT)
Dept: PHYSICAL THERAPY | Age: 31
End: 2021-06-25
Payer: COMMERCIAL

## 2021-06-29 ENCOUNTER — APPOINTMENT (OUTPATIENT)
Dept: PHYSICAL THERAPY | Age: 31
End: 2021-06-29
Payer: COMMERCIAL

## 2021-09-15 ENCOUNTER — HOSPITAL ENCOUNTER (EMERGENCY)
Age: 31
Discharge: HOME | End: 2021-09-15
Attending: EMERGENCY MEDICINE | Admitting: INTERNAL MEDICINE
Payer: COMMERCIAL

## 2021-09-15 ENCOUNTER — APPOINTMENT (OUTPATIENT)
Dept: GENERAL RADIOLOGY | Age: 31
End: 2021-09-15
Attending: EMERGENCY MEDICINE
Payer: COMMERCIAL

## 2021-09-15 VITALS
OXYGEN SATURATION: 96 % | DIASTOLIC BLOOD PRESSURE: 56 MMHG | HEART RATE: 104 BPM | WEIGHT: 293 LBS | TEMPERATURE: 101.2 F | RESPIRATION RATE: 22 BRPM | SYSTOLIC BLOOD PRESSURE: 148 MMHG

## 2021-09-15 DIAGNOSIS — U07.1 PNEUMONIA DUE TO COVID-19 VIRUS: Primary | ICD-10-CM

## 2021-09-15 DIAGNOSIS — J12.82 PNEUMONIA DUE TO COVID-19 VIRUS: Primary | ICD-10-CM

## 2021-09-15 DIAGNOSIS — R09.02 HYPOXEMIA: ICD-10-CM

## 2021-09-15 LAB
ALBUMIN SERPL-MCNC: 3.6 G/DL (ref 3.5–5)
ALBUMIN/GLOB SERPL: 0.9 {RATIO} (ref 1.1–2.2)
ALP SERPL-CCNC: 80 U/L (ref 45–117)
ALT SERPL-CCNC: 38 U/L (ref 12–78)
ANION GAP SERPL CALC-SCNC: 11 MMOL/L (ref 5–15)
ARTERIAL PATENCY WRIST A: ABNORMAL
AST SERPL W P-5'-P-CCNC: 31 U/L (ref 15–37)
ATRIAL RATE: 109 BPM
BASE DEFICIT BLDA-SCNC: 5.9 MMOL/L (ref 0–2)
BASOPHILS # BLD: 0 K/UL (ref 0–0.2)
BASOPHILS NFR BLD: 0 % (ref 0–2.5)
BDY SITE: ABNORMAL
BILIRUB SERPL-MCNC: 0.3 MG/DL (ref 0.2–1)
BUN SERPL-MCNC: 8 MG/DL (ref 6–20)
BUN/CREAT SERPL: 8 (ref 12–20)
CA-I BLD-MCNC: 8.5 MG/DL (ref 8.5–10.1)
CALCULATED P AXIS, ECG09: 52 DEGREES
CALCULATED R AXIS, ECG10: -21 DEGREES
CALCULATED T AXIS, ECG11: 15 DEGREES
CHLORIDE SERPL-SCNC: 104 MMOL/L (ref 97–108)
CO2 SERPL-SCNC: 24 MMOL/L (ref 21–32)
COHGB MFR BLD: 0.3 % (ref 0–5)
CREAT SERPL-MCNC: 0.98 MG/DL (ref 0.55–1.02)
DIAGNOSIS, 93000: NORMAL
EOSINOPHIL # BLD: 0 K/UL (ref 0–0.7)
EOSINOPHIL NFR BLD: 0 % (ref 0.9–2.9)
ERYTHROCYTE [DISTWIDTH] IN BLOOD BY AUTOMATED COUNT: 12.6 % (ref 11.5–14.5)
FIO2 ON VENT: 21 %
GLOBULIN SER CALC-MCNC: 3.8 G/DL (ref 2–4)
GLUCOSE SERPL-MCNC: 146 MG/DL (ref 65–100)
HCO3 BLDA-SCNC: 17 MMOL/L (ref 22–26)
HCT VFR BLD AUTO: 42.6 % (ref 36–46)
HGB BLD OXIMETRY-MCNC: 13.9 G/DL (ref 13.5–17.5)
HGB BLD-MCNC: 14.5 G/DL (ref 13.5–17.5)
LACTATE SERPL-SCNC: 1 MMOL/L (ref 0.4–2)
LYMPHOCYTES # BLD: 0.8 K/UL (ref 1–4.8)
LYMPHOCYTES NFR BLD: 25 % (ref 20.5–51.1)
MCH RBC QN AUTO: 33.5 PG (ref 31–34)
MCHC RBC AUTO-ENTMCNC: 34.1 G/DL (ref 31–36)
MCV RBC AUTO: 98 FL (ref 80–100)
METHGB MFR BLD: 0.3 % (ref 0–5)
MONOCYTES # BLD: 0.1 K/UL (ref 0.2–2.4)
MONOCYTES NFR BLD: 4 % (ref 1.7–9.3)
NEUTS SEG # BLD: 2.3 K/UL (ref 1.8–7.7)
NEUTS SEG NFR BLD: 71 % (ref 42–75)
NRBC # BLD: 0.01 K/UL
NRBC BLD-RTO: 0.4 PER 100 WBC
OXYHGB MFR BLD: 96.3 % (ref 95–100)
P-R INTERVAL, ECG05: 118 MS
PCO2 BLDA: 26 MMHG (ref 35–45)
PH BLDA: 7.43 [PH] (ref 7.35–7.45)
PLATELET # BLD AUTO: 157 K/UL (ref 150–400)
PMV BLD AUTO: 8.3 FL (ref 6.5–11.5)
PO2 BLDA: 83 MMHG (ref 70–100)
POTASSIUM SERPL-SCNC: 3.6 MMOL/L (ref 3.5–5.1)
PROT SERPL-MCNC: 7.4 G/DL (ref 6.4–8.2)
Q-T INTERVAL, ECG07: 472 MS
QRS DURATION, ECG06: 95 MS
QTC CALCULATION (BEZET), ECG08: 636 MS
RBC # BLD AUTO: 4.34 M/UL (ref 4.5–5.9)
SAO2% DEVICE SAO2% SENSOR NAME: ABNORMAL
SODIUM SERPL-SCNC: 139 MMOL/L (ref 136–145)
SPECIMEN SITE: ABNORMAL
TROPONIN I SERPL-MCNC: <0.05 NG/ML
VENTRICULAR RATE, ECG03: 109 BPM
WBC # BLD AUTO: 3.3 K/UL (ref 4.4–11.3)

## 2021-09-15 PROCEDURE — 74011250636 HC RX REV CODE- 250/636: Performed by: EMERGENCY MEDICINE

## 2021-09-15 PROCEDURE — 82803 BLOOD GASES ANY COMBINATION: CPT

## 2021-09-15 PROCEDURE — 96374 THER/PROPH/DIAG INJ IV PUSH: CPT

## 2021-09-15 PROCEDURE — 99285 EMERGENCY DEPT VISIT HI MDM: CPT

## 2021-09-15 PROCEDURE — 80053 COMPREHEN METABOLIC PANEL: CPT

## 2021-09-15 PROCEDURE — 85025 COMPLETE CBC W/AUTO DIFF WBC: CPT

## 2021-09-15 PROCEDURE — 65270000029 HC RM PRIVATE

## 2021-09-15 PROCEDURE — 93005 ELECTROCARDIOGRAM TRACING: CPT

## 2021-09-15 PROCEDURE — 87040 BLOOD CULTURE FOR BACTERIA: CPT

## 2021-09-15 PROCEDURE — 84484 ASSAY OF TROPONIN QUANT: CPT

## 2021-09-15 PROCEDURE — 74011250637 HC RX REV CODE- 250/637: Performed by: EMERGENCY MEDICINE

## 2021-09-15 PROCEDURE — 36600 WITHDRAWAL OF ARTERIAL BLOOD: CPT

## 2021-09-15 PROCEDURE — 71045 X-RAY EXAM CHEST 1 VIEW: CPT

## 2021-09-15 PROCEDURE — 83605 ASSAY OF LACTIC ACID: CPT

## 2021-09-15 PROCEDURE — 74011250637 HC RX REV CODE- 250/637

## 2021-09-15 RX ORDER — IBUPROFEN 800 MG/1
800 TABLET ORAL
Status: COMPLETED | OUTPATIENT
Start: 2021-09-15 | End: 2021-09-15

## 2021-09-15 RX ORDER — SODIUM CHLORIDE 0.9 % (FLUSH) 0.9 %
5-40 SYRINGE (ML) INJECTION AS NEEDED
Status: DISCONTINUED | OUTPATIENT
Start: 2021-09-15 | End: 2021-09-15 | Stop reason: HOSPADM

## 2021-09-15 RX ORDER — POLYETHYLENE GLYCOL 3350 17 G/17G
17 POWDER, FOR SOLUTION ORAL DAILY PRN
Status: DISCONTINUED | OUTPATIENT
Start: 2021-09-15 | End: 2021-09-15 | Stop reason: HOSPADM

## 2021-09-15 RX ORDER — ACETAMINOPHEN 500 MG
1000 TABLET ORAL
Status: DISCONTINUED | OUTPATIENT
Start: 2021-09-15 | End: 2021-09-15

## 2021-09-15 RX ORDER — SODIUM CHLORIDE 9 MG/ML
150 INJECTION, SOLUTION INTRAVENOUS ONCE
Status: COMPLETED | OUTPATIENT
Start: 2021-09-15 | End: 2021-09-15

## 2021-09-15 RX ORDER — ACETAMINOPHEN 500 MG
TABLET ORAL
Status: COMPLETED
Start: 2021-09-15 | End: 2021-09-15

## 2021-09-15 RX ORDER — ACETAMINOPHEN 325 MG/1
650 TABLET ORAL
Status: DISCONTINUED | OUTPATIENT
Start: 2021-09-15 | End: 2021-09-15 | Stop reason: HOSPADM

## 2021-09-15 RX ORDER — SODIUM CHLORIDE 0.9 % (FLUSH) 0.9 %
5-40 SYRINGE (ML) INJECTION EVERY 8 HOURS
Status: DISCONTINUED | OUTPATIENT
Start: 2021-09-15 | End: 2021-09-15 | Stop reason: HOSPADM

## 2021-09-15 RX ORDER — DEXAMETHASONE SODIUM PHOSPHATE 4 MG/ML
6 INJECTION, SOLUTION INTRA-ARTICULAR; INTRALESIONAL; INTRAMUSCULAR; INTRAVENOUS; SOFT TISSUE
Status: COMPLETED | OUTPATIENT
Start: 2021-09-15 | End: 2021-09-15

## 2021-09-15 RX ORDER — ENOXAPARIN SODIUM 100 MG/ML
40 INJECTION SUBCUTANEOUS DAILY
Status: DISCONTINUED | OUTPATIENT
Start: 2021-09-15 | End: 2021-09-15 | Stop reason: HOSPADM

## 2021-09-15 RX ORDER — ONDANSETRON 2 MG/ML
4 INJECTION INTRAMUSCULAR; INTRAVENOUS
Status: DISCONTINUED | OUTPATIENT
Start: 2021-09-15 | End: 2021-09-15 | Stop reason: HOSPADM

## 2021-09-15 RX ORDER — ACETAMINOPHEN 650 MG/1
650 SUPPOSITORY RECTAL
Status: DISCONTINUED | OUTPATIENT
Start: 2021-09-15 | End: 2021-09-15 | Stop reason: HOSPADM

## 2021-09-15 RX ADMIN — SODIUM CHLORIDE 150 ML/HR: 9 INJECTION, SOLUTION INTRAVENOUS at 05:53

## 2021-09-15 RX ADMIN — DEXAMETHASONE SODIUM PHOSPHATE 6 MG: 4 INJECTION, SOLUTION INTRAMUSCULAR; INTRAVENOUS at 07:07

## 2021-09-15 RX ADMIN — ACETAMINOPHEN 1000 MG: 500 TABLET ORAL at 05:53

## 2021-09-15 RX ADMIN — IBUPROFEN 800 MG: 800 TABLET, FILM COATED ORAL at 05:53

## 2021-09-15 RX ADMIN — Medication 1000 MG: at 05:53

## 2021-09-15 NOTE — ED NOTES
Patient taken off oxygen and ambulated around room. Patient became visibly dyspneic and oxygen saturations dropped to  89% on RA. Patient placed back in bed and oxygen reapplied at [facility]. Dr. Toi Edwards made aware.

## 2021-09-15 NOTE — Clinical Note
Status[de-identified] INPATIENT [101]   Type of Bed: Medical [8]   Inpatient Hospitalization Certified Necessary for the Following Reasons: 3. Patient receiving treatment that can only be provided in an inpatient setting (further clarification in H&P documentation)   Admitting Diagnosis: Pneumonia due to COVID-19 virus [5189852532]   Admitting Diagnosis: Hypoxemia [799.02. ICD-9-CM]   Admitting Physician: Jaylyn Mcmullen [[id number]]   Attending Physician: Jaylyn Mcmullen [[id number]]   Estimated Length of Stay: 3-4 Midnights   Discharge Plan[de-identified] Home with Office Follow-up

## 2021-09-15 NOTE — ED PROVIDER NOTES
Patient presents with complaint of increasing shortness of breath and a fever. She was diagnosed Covid positive last Wednesday. Since then she has had a cough and fever. A few days ago with diarrhea and nausea. Patient became very dyspneic on exertion and her oxygen saturation dropped into the upper 80's . Duration:  1 week    Intensity: severe    Modified by: exertion    Social History : Quit smoking 6 months ago. Drinks socially. No past medical history on file. No past surgical history on file. No family history on file. Social History     Socioeconomic History    Marital status: SINGLE     Spouse name: Not on file    Number of children: Not on file    Years of education: Not on file    Highest education level: Not on file   Occupational History    Not on file   Tobacco Use    Smoking status: Not on file   Substance and Sexual Activity    Alcohol use: Not on file    Drug use: Not on file    Sexual activity: Not on file   Other Topics Concern    Not on file   Social History Narrative    Not on file     Social Determinants of Health     Financial Resource Strain:     Difficulty of Paying Living Expenses:    Food Insecurity:     Worried About Running Out of Food in the Last Year:     [address] in the Last Year:    Transportation Needs:     Lack of Transportation (Medical):      Lack of Transportation (Non-Medical):    Physical Activity:     Days of Exercise per Week:     Minutes of Exercise per Session:    Stress:     Feeling of Stress :    Social Connections:     Frequency of Communication with Friends and Family:     Frequency of Social Gatherings with Friends and Family:     Attends [employer] Services:     Active Member of Clubs or Organizations:     Attends Club or Organization Meetings:     Marital Status:    Intimate Partner Violence:     Fear of Current or Ex-Partner:     Emotionally Abused:     Physically Abused:     Sexually Abused: ALLERGIES: Patient has no known allergies. Review of Systems   Constitutional: Positive for fatigue and fever. HENT: Negative. Eyes: Negative. Respiratory: Positive for cough and shortness of breath. Cardiovascular: Negative. Gastrointestinal: Positive for diarrhea and nausea. Endocrine: Negative. Genitourinary: Negative. Musculoskeletal: Positive for myalgias. Skin: Negative. Allergic/Immunologic: Negative. Neurological: Negative. Hematological: Negative. Psychiatric/Behavioral: Negative. All other systems reviewed and are negative. Vitals:    09/15/21 0544   BP: (!) 154/109   Pulse: (!) 129   Resp: 24   Temp: (!) 103 °F (39.4 °C)   SpO2: 96%            Physical Exam  Vitals and nursing note reviewed. Constitutional:       Appearance: She is well-developed. She is ill-appearing. HENT:      Head: Normocephalic and atraumatic. Cardiovascular:      Rate and Rhythm: Normal rate and regular rhythm. Heart sounds: Normal heart sounds. Pulmonary:      Breath sounds: Examination of the right-middle field reveals rhonchi. Examination of the left-middle field reveals rhonchi. Rhonchi present. Abdominal:      General: Bowel sounds are normal.      Palpations: Abdomen is soft. Musculoskeletal:         General: Normal range of motion. Cervical back: Normal range of motion and neck supple. Neurological:      General: No focal deficit present. Mental Status: She is alert. Psychiatric:         Mood and Affect: Mood normal.         Behavior: Behavior normal.          MDM  Number of Diagnoses or Management Options  Risk of Complications, Morbidity, and/or Mortality  Presenting problems: high  Diagnostic procedures: moderate  Management options: moderate  General comments: EKG :Sinus tachycardia 109, LVH.  Nonspecific ST changes    Discussed with Dr Griselda Gerber and will admit for Covid pneumonia and hypoxemia    Patient Progress  Patient progress: improved         Critical Care  Performed by: Karissa Lock MD  Authorized by: Karissa Lock MD     Critical care provider statement:     Critical care start time:  9/15/2021 6:05 AM    Critical care end time:  9/15/2021 7:21 AM    Critical care was necessary to treat or prevent imminent or life-threatening deterioration of the following conditions:  Respiratory failure    Critical care was time spent personally by me on the following activities:  Ordering and performing treatments and interventions, ordering and review of laboratory studies, ordering and review of radiographic studies, pulse oximetry, re-evaluation of patient's condition and discussions with consultants

## 2021-09-15 NOTE — ED NOTES
Pt states that she does want to get admitted due to family issues--- Dr. Bharati Fields at bedside educated Pt and told consequences of leaving AMA. .-Pt still adamant she wants to leave.

## 2021-09-15 NOTE — ED TRIAGE NOTES
Patient reports that she tested positive for COVID on Wednesday, states that since then she had continued to have a cough & fever. States that she began having diarrhea a few days ago & vomiting since yesterday. Temp at triage is 103, reports taking tylenol at approx 0400.

## 2021-09-15 NOTE — ED NOTES
Dr. Carmen Alonso reviewed discharge instructions with the patient. The patient verbalized understanding. All questions and concerns were addressed. The patient declined a wheelchair and is discharged ambulatory in the care of family members with instructions and prescriptions in hand. Pt is alert and oriented x 4. Respirations are clear and unlabored.

## 2021-09-16 ENCOUNTER — PATIENT OUTREACH (OUTPATIENT)
Dept: CASE MANAGEMENT | Age: 31
End: 2021-09-16

## 2021-09-16 NOTE — PROGRESS NOTES
Patient contacted regarding COVID-19 risk, exposure, diagnosis, pulse oximeter ordered at discharge, monoclonal antibody infusion follow up. Discussed COVID-19 related testing which was not done at this time. Was diagnosed at urgent care on . Care Transition Nurse contacted the patient by telephone to perform post discharge assessment. Call within 2 business days of discharge: Yes Verified name and  with patient as identifiers. Provided introduction to self, and explanation of the CTN/ACM role, and reason for call due to risk factors for infection and/or exposure to COVID-19. Symptoms reviewed with patient who verbalized the following symptoms: no new symptoms. Reports she left [facility] hospital AMA because ER didn't appear clean to her, room not clean. Left and went to [facility] treated there in ER and discharged. Given antibiotic, steroid and inhaler to use. ER doctor there felt PNA in patches and could be managed at home. Temp down to 98 by time went to second ER and pulse Ox was up to 98%. Drinking fluids, resting, taking meds as directed. Due to no new or worsening symptoms encounter was not routed to provider for escalation. Discussed follow-up appointments. If no appointment was previously scheduled, appointment scheduling offered:  yes. Karthik Steinberg Dr follow up appointment(s): No future appointments. Non-[facility] follow up appointment(s): ,pcp-patient to call and schedule VV. Interventions to address risk factors: Scheduled appointment with PCP-patient to call pcp in [employer] to schedule VV. and Obtained and reviewed discharge summary and/or continuity of care documents     Advance Care Planning:   Does patient have an Advance Directive: not on file. Educated patient about risk for severe COVID-19 due to risk factors according to CDC guidelines.  CTN reviewed discharge instructions, medical action plan and red flag symptoms with the patient who verbalized understanding. Discussed COVID vaccination status: yes. Education provided on COVID-19 vaccination as appropriate. Discussed exposure protocols and quarantine with CDC Guidelines. Patient was given an opportunity to verbalize any questions and concerns and agrees to contact CTN or health care provider for questions related to their healthcare. Reviewed and educated patient on any new and changed medications related to discharge diagnosis     Was patient discharged with a pulse oximeter? No                                                                                                                                          CTN provided contact information. Plan for follow-up call in 5-7 days based on severity of symptoms and risk factors.

## 2021-09-20 LAB
BACTERIA SPEC CULT: NORMAL
BACTERIA SPEC CULT: NORMAL
SPECIAL REQUESTS,SREQ: NORMAL
SPECIAL REQUESTS,SREQ: NORMAL

## 2021-10-06 ENCOUNTER — PATIENT OUTREACH (OUTPATIENT)
Dept: CASE MANAGEMENT | Age: 31
End: 2021-10-06

## 2021-10-06 NOTE — PROGRESS NOTES
Patient resolved from [address] episode on 10/6/21. Discussed COVID-19 related testing which was available at this time. Test results were positive. Patient informed of results, if available? yes     Patient/family has been provided the following resources and education related to COVID-19:                         Signs, symptoms and red flags related to COVID-19            [facility] exposure and quarantine guidelines            [facility] exposure contact - [phone]            Contact for their local Department of Health                 Patient currently reports that the following symptoms have improved:  no worsening symptoms-all symptoms have improved. Only occasional sob/cough. .    No further outreach scheduled with this CTN/ACM/LPN/HC/ MA. Episode of Care resolved. Patient has this CTN/ACM/LPN/HC/MA contact information if future needs arise.